# Patient Record
Sex: MALE | Race: WHITE | NOT HISPANIC OR LATINO | Employment: UNEMPLOYED | URBAN - METROPOLITAN AREA
[De-identification: names, ages, dates, MRNs, and addresses within clinical notes are randomized per-mention and may not be internally consistent; named-entity substitution may affect disease eponyms.]

---

## 2023-12-06 ENCOUNTER — HOSPITAL ENCOUNTER (EMERGENCY)
Facility: CLINIC | Age: 38
Discharge: HOME | End: 2023-12-06
Attending: EMERGENCY MEDICINE | Admitting: EMERGENCY MEDICINE
Payer: COMMERCIAL

## 2023-12-06 VITALS
BODY MASS INDEX: 21.48 KG/M2 | DIASTOLIC BLOOD PRESSURE: 91 MMHG | WEIGHT: 145 LBS | OXYGEN SATURATION: 97 % | HEIGHT: 69 IN | HEART RATE: 98 BPM | SYSTOLIC BLOOD PRESSURE: 149 MMHG | RESPIRATION RATE: 22 BRPM | TEMPERATURE: 98.7 F

## 2023-12-06 DIAGNOSIS — F10.920 ALCOHOLIC INTOXICATION WITHOUT COMPLICATION (H): ICD-10-CM

## 2023-12-06 LAB
ALBUMIN SERPL BCG-MCNC: 4.6 G/DL (ref 3.5–5.2)
ALP SERPL-CCNC: 62 U/L (ref 40–150)
ALT SERPL W P-5'-P-CCNC: 15 U/L (ref 0–70)
ANION GAP SERPL CALCULATED.3IONS-SCNC: 12 MMOL/L (ref 7–15)
AST SERPL W P-5'-P-CCNC: 30 U/L (ref 0–45)
BASOPHILS # BLD AUTO: 0 10E3/UL (ref 0–0.2)
BASOPHILS NFR BLD AUTO: 0 %
BILIRUB SERPL-MCNC: 0.2 MG/DL
BUN SERPL-MCNC: 9.7 MG/DL (ref 6–20)
CALCIUM SERPL-MCNC: 8.8 MG/DL (ref 8.6–10)
CHLORIDE SERPL-SCNC: 99 MMOL/L (ref 98–107)
CREAT SERPL-MCNC: 0.6 MG/DL (ref 0.67–1.17)
DEPRECATED HCO3 PLAS-SCNC: 28 MMOL/L (ref 22–29)
EGFRCR SERPLBLD CKD-EPI 2021: >90 ML/MIN/1.73M2
EOSINOPHIL # BLD AUTO: 0 10E3/UL (ref 0–0.7)
EOSINOPHIL NFR BLD AUTO: 0 %
ERYTHROCYTE [DISTWIDTH] IN BLOOD BY AUTOMATED COUNT: 12.2 % (ref 10–15)
ETHANOL SERPL-MCNC: 0.32 G/DL
GLUCOSE SERPL-MCNC: 111 MG/DL (ref 70–99)
HCT VFR BLD AUTO: 41.4 % (ref 40–53)
HGB BLD-MCNC: 14.3 G/DL (ref 13.3–17.7)
IMM GRANULOCYTES # BLD: 0 10E3/UL
IMM GRANULOCYTES NFR BLD: 0 %
LYMPHOCYTES # BLD AUTO: 1.5 10E3/UL (ref 0.8–5.3)
LYMPHOCYTES NFR BLD AUTO: 13 %
MCH RBC QN AUTO: 33 PG (ref 26.5–33)
MCHC RBC AUTO-ENTMCNC: 34.5 G/DL (ref 31.5–36.5)
MCV RBC AUTO: 96 FL (ref 78–100)
MONOCYTES # BLD AUTO: 0.7 10E3/UL (ref 0–1.3)
MONOCYTES NFR BLD AUTO: 6 %
NEUTROPHILS # BLD AUTO: 9.2 10E3/UL (ref 1.6–8.3)
NEUTROPHILS NFR BLD AUTO: 81 %
NRBC # BLD AUTO: 0 10E3/UL
NRBC BLD AUTO-RTO: 0 /100
PLATELET # BLD AUTO: 178 10E3/UL (ref 150–450)
POTASSIUM SERPL-SCNC: 4 MMOL/L (ref 3.4–5.3)
PROT SERPL-MCNC: 7.3 G/DL (ref 6.4–8.3)
RBC # BLD AUTO: 4.33 10E6/UL (ref 4.4–5.9)
SODIUM SERPL-SCNC: 139 MMOL/L (ref 135–145)
WBC # BLD AUTO: 11.4 10E3/UL (ref 4–11)

## 2023-12-06 PROCEDURE — 250N000011 HC RX IP 250 OP 636: Mod: JZ | Performed by: EMERGENCY MEDICINE

## 2023-12-06 PROCEDURE — 82077 ASSAY SPEC XCP UR&BREATH IA: CPT | Performed by: EMERGENCY MEDICINE

## 2023-12-06 PROCEDURE — 96376 TX/PRO/DX INJ SAME DRUG ADON: CPT

## 2023-12-06 PROCEDURE — 99285 EMERGENCY DEPT VISIT HI MDM: CPT | Mod: 25

## 2023-12-06 PROCEDURE — 96374 THER/PROPH/DIAG INJ IV PUSH: CPT

## 2023-12-06 PROCEDURE — 258N000003 HC RX IP 258 OP 636: Performed by: EMERGENCY MEDICINE

## 2023-12-06 PROCEDURE — 96361 HYDRATE IV INFUSION ADD-ON: CPT

## 2023-12-06 PROCEDURE — 250N000013 HC RX MED GY IP 250 OP 250 PS 637: Performed by: EMERGENCY MEDICINE

## 2023-12-06 PROCEDURE — 36415 COLL VENOUS BLD VENIPUNCTURE: CPT | Performed by: EMERGENCY MEDICINE

## 2023-12-06 PROCEDURE — 80053 COMPREHEN METABOLIC PANEL: CPT | Performed by: EMERGENCY MEDICINE

## 2023-12-06 PROCEDURE — 85014 HEMATOCRIT: CPT | Performed by: EMERGENCY MEDICINE

## 2023-12-06 RX ORDER — MAGNESIUM HYDROXIDE/ALUMINUM HYDROXICE/SIMETHICONE 120; 1200; 1200 MG/30ML; MG/30ML; MG/30ML
15 SUSPENSION ORAL ONCE
Status: DISCONTINUED | OUTPATIENT
Start: 2023-12-06 | End: 2023-12-06 | Stop reason: HOSPADM

## 2023-12-06 RX ORDER — ONDANSETRON 2 MG/ML
4 INJECTION INTRAMUSCULAR; INTRAVENOUS EVERY 30 MIN PRN
Status: DISCONTINUED | OUTPATIENT
Start: 2023-12-06 | End: 2023-12-06 | Stop reason: HOSPADM

## 2023-12-06 RX ADMIN — NICOTINE POLACRILEX 2 MG: 2 GUM, CHEWING ORAL at 17:59

## 2023-12-06 RX ADMIN — SODIUM CHLORIDE 1000 ML: 9 INJECTION, SOLUTION INTRAVENOUS at 12:26

## 2023-12-06 RX ADMIN — ONDANSETRON 4 MG: 2 INJECTION INTRAMUSCULAR; INTRAVENOUS at 13:35

## 2023-12-06 RX ADMIN — ONDANSETRON 4 MG: 2 INJECTION INTRAMUSCULAR; INTRAVENOUS at 12:29

## 2023-12-06 ASSESSMENT — ACTIVITIES OF DAILY LIVING (ADL)
ADLS_ACUITY_SCORE: 35

## 2023-12-06 NOTE — ED PROVIDER NOTES
Received sign out from Dr. Mario.      Patient presented with alcohol intoxication from the airport.  I reevaluated the patient he is sitting comfortably in the room has eaten.  Denies any tremors, nausea, vomiting, headaches.  States that he has hiccups which she has frequently.  States he does not feel like he is withdrawing at this time.  States that he hopes to find a flight to get to [place].  No suicidal ideations at this time.  Will continue monitoring the patient.  Will give GI cocktail, simethicone for his hiccups.  Will reevaluate.      6:25 PM:   Patient reevaluated again he appears clinically sober ambulatory, tolerating p.o. without any difficulty.  Denies any headaches, nausea, vomiting, tremors.  No hallucinations.  No suicidal thoughts.  Patient clinically sober and stable for discharge.      DO Ash Dawkins Tiffani, DO  12/06/23 3517

## 2023-12-06 NOTE — ED PROVIDER NOTES
"  History   Chief Complaint:  Alcohol Problem     HPI   Noé Astorga is a 38 year old male who presents with alcohol intoxication. The patient has had vodka for the past 6 days, including earlier today. He last drank 2-3 hours PTA. He is concerned for alcohol withdrawal, but denies any medical symptoms. He feels anxious however. He states that he was in treatment last in October.     Independent Historian:    Yesterday spoke with wife over the phone it sounded the patient has been intoxicated for significant time at home, it is now becoming an issue as this is happening in front of her 2 young children.  Wife was strongly optimistic that the patient was able to make it on the flight to [place] today, but is unable to  the patient from the ER.    Review of External Notes:  N/A    Medications:    No known medications     Past Medical History:    Alcohol withdrawal seizure  Physical Exam   Patient Vitals for the past 24 hrs:   BP Temp Temp src Pulse Resp SpO2 Height Weight   12/06/23 1213 -- 98.7  F (37.1  C) Temporal -- -- -- -- --   12/06/23 1212 (!) 140/91 -- -- 98 22 96 % 1.753 m (5' 9\") 65.8 kg (145 lb)   Physical Exam  Vitals: reviewed by me  General: Pt seen on [facility], pleasant, cooperative, and alert to conversation.  Very intoxicated appearing however  Eyes: Tracking well, clear conjunctiva BL  ENT: MMM, midline trachea.  No evidence of trauma to head or neck  Lungs: No tachypnea, no accessory muscle use. No respiratory distress.   CV: Rate as above  MSK: no joint effusion.  No evidence of trauma  Skin: No rash  Neuro: Slurred speech and no facial droop.  Psych: Not RIS, no e/o AH/VH      Emergency Department Course   Laboratory:  Labs Ordered and Resulted from Time of ED Arrival to Time of ED Departure   COMPREHENSIVE METABOLIC PANEL - Abnormal       Result Value    Sodium 139      Potassium 4.0      Carbon Dioxide (CO2) 28      Anion Gap 12      Urea Nitrogen 9.7      Creatinine 0.60 (*)  "    GFR Estimate >90      Calcium 8.8      Chloride 99      Glucose 111 (*)     Alkaline Phosphatase 62      AST 30      ALT 15      Protein Total 7.3      Albumin 4.6      Bilirubin Total 0.2     ETHYL ALCOHOL LEVEL - Abnormal    Alcohol ethyl 0.32 (*)    CBC WITH PLATELETS AND DIFFERENTIAL - Abnormal    WBC Count 11.4 (*)     RBC Count 4.33 (*)     Hemoglobin 14.3      Hematocrit 41.4      MCV 96      MCH 33.0      MCHC 34.5      RDW 12.2      Platelet Count 178      % Neutrophils 81      % Lymphocytes 13      % Monocytes 6      % Eosinophils 0      % Basophils 0      % Immature Granulocytes 0      NRBCs per 100 WBC 0      Absolute Neutrophils 9.2 (*)     Absolute Lymphocytes 1.5      Absolute Monocytes 0.7      Absolute Eosinophils 0.0      Absolute Basophils 0.0      Absolute Immature Granulocytes 0.0      Absolute NRBCs 0.0        Emergency Department Course & Assessments:      Interventions:  Medications   sodium chloride 0.9% BOLUS 1,000 mL (1,000 mLs Intravenous $New Bag 12/6/23 1226)   ondansetron (ZOFRAN) injection 4 mg (4 mg Intravenous $Given 12/6/23 1229)      Assessments:  1212 I obtained history and examined patient.   1255 I rechecked the patient and explained findings.  0103 I spoke with the patient's wife and explained findings, presentation, and plan of care.    Independent Interpretation (X-rays, CTs, rhythm strip):  None    Consultations/Discussion of Management or Tests:  None    Social Determinants of Health affecting care:  Stress/Adjustment Disorders     Disposition:  Care of the patient was transferred to my colleague Dr. Aleman pending clinical sobriety.   Impression & Plan    Medical Decision Making:  This is an intoxicated 38-year-old male who presents emergency room asking for treatment for possible alcohol withdrawal.  I do not see any evidence of alcohol withdrawal, he has no tremors here, and does appear to be actively intoxicated noting that he did drink alcohol about 2 hours prior to  arrival.  No evidence of trauma, he comes in the airport as it sounds like [place] was [place] and possibly deal with some type of intervention for his alcohol use.  However he is here, and with his alcohol being what it is he is not stable to take care of himself, needs to stay here until he is metabolize his alcohol and is clinically sober.  If at any point he develops alcohol withdrawal during that time I do think that he should be treated of course, as based on my discussion with the patient and his wife it does sound like the patient's alcohol withdrawal tends to be very severe.    Will plan for careful monitoring at this time, no indication for a DEC assessment as he is not suicidal or homicidal, and can be discharged home when clinically sober and less signs and symptoms of alcohol withdrawal develop.      Diagnosis:    ICD-10-CM    1. Alcoholic intoxication without complication (H24)  F10.920               Scribe Disclosure:  Reinaldo SANCHEZ, am serving as a scribe at 12:08 PM on 12/6/2023 to document services personally performed by Omega Mario MD based on my observations and the provider's statements to me.  12/6/2023   Omega Mario MD Fitzgerald, Michael Maxwell Kreofsky, MD  12/06/23 1312

## 2023-12-06 NOTE — ED TRIAGE NOTES
Pt at airport. Drank alcohol 2 hours PTA in ED to not go into withdrawal.  Was going to visit dad in [facility] [place] [facility]     Triage Assessment (Adult)       Row Name 12/06/23 1201          Triage Assessment    Airway WDL WDL        Respiratory WDL    Respiratory WDL WDL        Cardiac WDL    Cardiac WDL WDL        Peripheral/Neurovascular WDL    Peripheral Neurovascular WDL WDL        Cognitive/Neuro/Behavioral WDL    Cognitive/Neuro/Behavioral WDL WDL

## 2023-12-06 NOTE — ED NOTES
DATE/TIME OF CALL RECEIVED FROM LAB:  12/06/23 at 12:57 PM   LAB TEST:  Serum Ethanol  LAB VALUE:  0.32  PROVIDER NOTIFIED?: Yes  PROVIDER NAME: Dr. Mario  DATE/TIME LAB VALUE REPORTED TO PROVIDER: 1258 12/6/2023  MECHANISM OF PROVIDER NOTIFICATION:  text  PROVIDER RESPONSE: Text response

## 2023-12-06 NOTE — ED NOTES
Dr. Mario at bedside discussing plan of care to pt and discharge. Pt too intoxicated at this time.  The oncoming  Will reassess at 1800

## 2023-12-06 NOTE — ED NOTES
Bed: ED16  Expected date:   Expected time:   Means of arrival:   Comments:  [facility] - 522 - 38 M ETOH withdrawal eta 1149

## 2023-12-07 NOTE — DISCHARGE INSTRUCTIONS
Follow-up your doctor in 1 to 2 days    Return to the ER for any worsening or concerning symptoms